# Patient Record
Sex: MALE | URBAN - METROPOLITAN AREA
[De-identification: names, ages, dates, MRNs, and addresses within clinical notes are randomized per-mention and may not be internally consistent; named-entity substitution may affect disease eponyms.]

---
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
## 2023-09-07 NOTE — PROCEDURE: TREATMENT REGIMEN
Initiate Treatment: Epiduo forte: apply to full face every evening\\nBenzefoam 9.8%: apply to back two minutes before shower then rinse
Detail Level: Zone
No